# Patient Record
Sex: MALE | Race: OTHER | HISPANIC OR LATINO | URBAN - METROPOLITAN AREA
[De-identification: names, ages, dates, MRNs, and addresses within clinical notes are randomized per-mention and may not be internally consistent; named-entity substitution may affect disease eponyms.]

---

## 2019-04-23 ENCOUNTER — EMERGENCY (EMERGENCY)
Facility: HOSPITAL | Age: 29
LOS: 1 days | Discharge: HOME | End: 2019-04-23
Attending: EMERGENCY MEDICINE
Payer: COMMERCIAL

## 2019-04-23 VITALS
WEIGHT: 179.9 LBS | DIASTOLIC BLOOD PRESSURE: 73 MMHG | RESPIRATION RATE: 20 BRPM | OXYGEN SATURATION: 98 % | HEIGHT: 72 IN | HEART RATE: 80 BPM | TEMPERATURE: 98 F | SYSTOLIC BLOOD PRESSURE: 120 MMHG

## 2019-04-23 PROCEDURE — 90471 IMMUNIZATION ADMIN: CPT

## 2019-04-23 PROCEDURE — 90715 TDAP VACCINE 7 YRS/> IM: CPT

## 2019-04-23 PROCEDURE — 99283 EMERGENCY DEPT VISIT LOW MDM: CPT

## 2019-04-23 PROCEDURE — 99283 EMERGENCY DEPT VISIT LOW MDM: CPT | Mod: 25

## 2019-04-23 RX ORDER — ERYTHROMYCIN BASE 5 MG/GRAM
1 OINTMENT (GRAM) OPHTHALMIC (EYE) ONCE
Qty: 0 | Refills: 0 | Status: COMPLETED | OUTPATIENT
Start: 2019-04-23 | End: 2019-04-23

## 2019-04-23 RX ORDER — TETANUS TOXOID, REDUCED DIPHTHERIA TOXOID AND ACELLULAR PERTUSSIS VACCINE, ADSORBED 5; 2.5; 8; 8; 2.5 [IU]/.5ML; [IU]/.5ML; UG/.5ML; UG/.5ML; UG/.5ML
0.5 SUSPENSION INTRAMUSCULAR ONCE
Qty: 0 | Refills: 0 | Status: COMPLETED | OUTPATIENT
Start: 2019-04-23 | End: 2019-04-23

## 2019-04-23 RX ORDER — OFLOXACIN 0.3 %
1 DROPS OPHTHALMIC (EYE) ONCE
Qty: 0 | Refills: 0 | Status: DISCONTINUED | OUTPATIENT
Start: 2019-04-23 | End: 2019-04-23

## 2019-04-23 RX ADMIN — TETANUS TOXOID, REDUCED DIPHTHERIA TOXOID AND ACELLULAR PERTUSSIS VACCINE, ADSORBED 0.5 MILLILITER(S): 5; 2.5; 8; 8; 2.5 SUSPENSION INTRAMUSCULAR at 23:16

## 2019-04-23 RX ADMIN — Medication 1 APPLICATION(S): at 23:16

## 2019-04-23 NOTE — ED PROVIDER NOTE - CLINICAL SUMMARY MEDICAL DECISION MAKING FREE TEXT BOX
Pt with foreign body to the left eye. Will discharge with antibiotics and to follow up with opthomology first thing in the morning Pt with foreign body to the left eye, pt given Tetanus. Will discharge with antibiotics and to follow up with opthomology first thing in the morning

## 2019-04-23 NOTE — ED ADULT NURSE NOTE - OBJECTIVE STATEMENT
patient states havng redness and pain to left eye patient states happened today while working [employer] he has something in it

## 2019-04-23 NOTE — ED PROVIDER NOTE - RESPIRATORY, MLM
Patient Specific Counseling (Will Not Stick From Patient To Patient): Patient finished applying Efudex to lower lip x 2 weeks. Patient will apply for two more days and allow to heal. Much improved upon exam today Detail Level: Zone Breath sounds clear and equal bilaterally.

## 2019-04-23 NOTE — ED PROVIDER NOTE - PROGRESS NOTE DETAILS
Foreign body removal attempted by myself and Dr. Mccarthy, were not able to remove. Contacted Dr. Collado who reports pt is to follow up in the office tomorrow for foreign body removal. Pt to discharge with antibiotic ointment. Pt acknowledged understanding to follow up asap. Foreign body removal attempted by myself and Dr. Mccarthy, were not able to remove. Contacted Dr. Collado who reports pt is to follow up in the office at 8am tomorrow for foreign body removal. Pt to discharge with antibiotic ointment. Pt acknowledged understanding to follow up asap.

## 2019-04-23 NOTE — ED PROVIDER NOTE - OBJECTIVE STATEMENT
28yo male with no significant PMHx 28yo male with no significant PMHx comes in due to left eye redness/foreign body sensation. Pt is a  and was driving home from work a couple hours ago when he wiped his eye with his hand and felt something go into his eye. Pt now reporting redness, tearing and foreign body sensation. Pt denies f/c, n/v, HA.

## 2019-04-23 NOTE — ED PROVIDER NOTE - ATTENDING CONTRIBUTION TO CARE
I, Fabricio Mccarthy, performed a face to face bedside interview with this patient regarding history of present illness, review of symptoms and relevant past medical, social and family history.  I completed an independent physical examination. I have communicated the patient’s plan of care and disposition with the ACP.  29 year old male with no significant PMh presents with L eye pain after rubbing his eye, has foreign body sensation, no blurry vision  Gen: NAD, well appearing  ENT: PERRLA, EOMI, +corneal abrasion on woods lamp, punctate foreign body in eye  CV: RRR  Pul: CTA b/l  Abd: Soft, non-distended, non-tender  Neuro: no focal deficits  Unable to remove with irrigation, cotton swab, and Tn needle. Discussed with optho, will see first thing tomorrow am in office, will dc with erythromycin ointment

## 2019-04-24 PROBLEM — Z00.00 ENCOUNTER FOR PREVENTIVE HEALTH EXAMINATION: Status: ACTIVE | Noted: 2019-04-24

## 2021-03-23 ENCOUNTER — EMERGENCY (EMERGENCY)
Facility: HOSPITAL | Age: 31
LOS: 1 days | Discharge: HOME | End: 2021-03-23
Payer: COMMERCIAL

## 2021-03-23 VITALS
DIASTOLIC BLOOD PRESSURE: 90 MMHG | RESPIRATION RATE: 20 BRPM | TEMPERATURE: 97 F | OXYGEN SATURATION: 97 % | SYSTOLIC BLOOD PRESSURE: 140 MMHG | HEART RATE: 92 BPM | HEIGHT: 72 IN

## 2021-03-23 PROBLEM — Z78.9 OTHER SPECIFIED HEALTH STATUS: Chronic | Status: ACTIVE | Noted: 2019-04-23

## 2021-03-23 LAB — SARS-COV-2 RNA SPEC QL NAA+PROBE: SIGNIFICANT CHANGE UP

## 2021-03-23 PROCEDURE — 99283 EMERGENCY DEPT VISIT LOW MDM: CPT

## 2021-03-23 PROCEDURE — U0003: CPT

## 2021-03-23 PROCEDURE — 99282 EMERGENCY DEPT VISIT SF MDM: CPT

## 2021-03-23 PROCEDURE — U0005: CPT

## 2021-03-23 NOTE — ED PROVIDER NOTE - OBJECTIVE STATEMENT
32yo M presenting for covid testing. Pt needs swab for travel. Pt feeling well, no symptoms. Denies fever, chills, cough, sob, cp, body aches, loss of smell/taste.

## 2021-03-23 NOTE — ED PROVIDER NOTE - PATIENT PORTAL LINK FT
You can access the FollowMyHealth Patient Portal offered by [facility] by registering at the following website: [email or web address]. By joining [email or web address]’s FollowMyHealth portal, you will also be able to view your health information using other applications (apps) compatible with our system.

## 2021-06-08 NOTE — ED PROCEDURE NOTE - CPROC ED PATIENT POSITION1
Message routed to Dr. Hay and Yecenia Ponce NP, to review and advise.    Please advise on appointment.   supine

## 2024-02-08 NOTE — ED PROVIDER NOTE - NSTIMEPROVIDERCAREINITIATE_GEN_ER
Update    Assessment and vitals reviewed by RN.   Vitals stable when compared with last update, minimal heart failure symptoms according to nursing report.     Will continue to monitor   Faxed back             23-Mar-2021 12:22